# Patient Record
Sex: MALE | Race: WHITE
[De-identification: names, ages, dates, MRNs, and addresses within clinical notes are randomized per-mention and may not be internally consistent; named-entity substitution may affect disease eponyms.]

---

## 2019-04-12 NOTE — P.GSHP
History of Present Illness


H&P Date: 04/12/19


Chief Complaint: Left ureteral calculus





The patient is a 69-year-old male who developed the abrupt onset of left flank 

pain on 4/7/2019 in the evening.  The pain radiated into the left lower quadrant

and groin.  He experienced nausea and vomiting.  The pain persisted and he was 

evaluated in the [facility] emergency room on 4/8/2019.  CT scan of the 

abdomen and pelvis identified a 5.5 calculus that was located at the left 

ureterovesical junction and caused left hydro ureteronephrosis.  Multiple lower 

pole left renal calculi were also noted measuring up to 11 mm in diameter.  A 2 

x 1.4 cm high-density cortical lesion was present and was suspicious for a 

high-density cyst.  There was also an eccentric soft tissue density adjacent to 

the superior pole of dictation the measuring 3.7 x 2.7 x 3.1 cm.  Two 

nonobstructive right renal calculi measuring less than 2 or 3 mm in size were 

also noted.





The patient has no previous history of urolithiasis or gross hematuria.  He 

continues to have intermittent left flank and left groin pain.  He has been 

treated with tamsulosin, ibuprofen, hydrocodone and Zofran.  He was seen by me 

in the office on 4/12.  I reviewed options including further observation, 

ureteroscopy with lithotripsy or ESWL.  The patient is interested in the latter 

and wishes to proceed with this on 4/15 unless he passes the calculus prior to 

then.





- Constitutional


Constitutional: Denies chills, Denies fever





- Cardiovascular


Cardiovascular: Denies chest pain, Denies shortness of breath





- Respiratory


Respiratory: Denies cough, Denies wheezing





- Gastrointestinal


Gastrointestinal: Reports as per HPI, Denies change in bowel habits, Denies 

heartburn





- Genitourinary (Male)


Genitourinary: Denies dysuria, Denies hematuria





Past Medical History


Past Medical History: Cancer, Hypertension


Additional Past Medical History / Comment(s): HX MELANOMA BUTTOCK.  KIDNEY 

STONE.


History of Any Multi-Drug Resistant Organisms: None Reported


Past Surgical History: Hernia Repair


Additional Past Surgical History / Comment(s): COLONOSCOPY


Past Anesthesia/Blood Transfusion Reactions: No Reported Reaction


Smoking Status: Former smoker





- Past Family History


  ** Father


Family Medical History: Cancer


Additional Family Medical History / Comment(s): LUNG CA





  ** Mother


Family Medical History: Cancer


Additional Family Medical History / Comment(s): BREAST CA





Medications and Allergies


                                Home Medications











 Medication  Instructions  Recorded  Confirmed  Type


 


Chlorthalidone [Hygroton] 25 mg PO DAILY 04/12/19 04/12/19 History


 


HYDROcodone/APAP 5-325MG [Norco 1 tab PO Q4HR PRN 04/12/19 04/12/19 History





5-325]    


 


Ibuprofen [Motrin] 600 mg PO Q6HR PRN 04/12/19 04/12/19 History


 


Ondansetron [Zofran] 4 mg PO Q6HR PRN 04/12/19 04/12/19 History


 


Tamsulosin [Flomax] 0.4 mg PO DAILY 04/12/19 04/12/19 History


 


amLODIPine [Norvasc] 10 mg PO DAILY 04/12/19 04/12/19 History








                                    Allergies











Allergy/AdvReac Type Severity Reaction Status Date / Time


 


No Known Allergies Allergy   Verified 04/12/19 12:44














Surgical - Exam





- General


well developed, well nourished, no distress





- ENT


no hearing loss





- Neck


no masses, no lymphadectomy





- Respiratory


normal respiratory effort, clear to auscultation





- Cardiovascular


Rhythm: regular


Abnormal Heart Sounds: no systolic murmur, no diastolic murmur





- Abdomen


Abdomen: soft, no organomegaly


Hernia: none





Assessment and Plan


(1) Calculus of distal left ureter


Narrative/Plan: 


The patient will undergo ESWL treatment of his distal left ureteral calculus 

performed by Dr. Goodson.  He is aware of the operative risks which include anes

thesia, bleeding, inability to completely fragment the calculus and the possible

 need for an additional procedure such as ureteroscopy.


Status: Acute   Code(s): N20.1 - CALCULUS OF URETER   SNOMED Code(s): 536662787

## 2019-04-15 NOTE — XR
EXAMINATION TYPE: XR KUB

 

DATE OF EXAM: 4/15/2019

 

CLINICAL DATA:  69-year-old male preoperative evaluation left ureteral calculus, [facility]

 

COMPARISON:  None

 

FINDINGS:

 

Lung bases are clear. 

 

No evidence for free intraperitoneal air. 

 

Mild to moderate stool burden. Nonobstructive bowel gas pattern. No definite suspicious calcification
 appreciated symmetrically.

 

Stellate density projecting at the right iliac bone, probable bone island.

 

 

IMPRESSION: 

No definite suspicious calcification is radiographically apparent on the present exam.

## 2019-04-15 NOTE — P.OP
Date of Procedure: 04/15/19


Preoperative Diagnosis: 


Left ureteral stone


Postoperative Diagnosis: 


Same, passed


Anesthesia: none


Pathology: none sent


Disposition: same day


Indications for Procedure: 


The patient is 69.  He has a 6 mm distal ureteral stone at the ureterovesical 

junction.  It is not well seen on KUB today.  He comes for shockwave lithotripsy

he may have passed a stone is not certain as he has not been straining.


Description of Procedure: 


The patient is brought to the operating suite and placed on the lithotripsy 

table before any sedation.  I fluoroscoped the patient and do not see any 

evidence of stone therefore the procedure was not performed.





Impression: Stone, passed 





Plan patient be discharged home and follow-up in the office.

## 2022-01-25 ENCOUNTER — HOSPITAL ENCOUNTER (OUTPATIENT)
Dept: HOSPITAL 47 - RADMRIMAIN | Age: 72
Discharge: HOME | End: 2022-01-25
Attending: UROLOGY
Payer: COMMERCIAL

## 2022-01-25 DIAGNOSIS — N40.0: Primary | ICD-10-CM

## 2022-01-25 PROCEDURE — 72197 MRI PELVIS W/O & W/DYE: CPT

## 2022-01-26 NOTE — MR
EXAMINATION TYPE: MR Prostate wo/w con

 

DATE OF EXAM: 1/25/2022

 

COMPARISON: None.

 

IMAGE QUALITY: Good.

 

INDICATION: Elevated PSA 

PSA:  10.30 ng/ml on December 9, 2021

Recent Biopsy and Date: None.

Pathology Report (If Applicable): n/a

 

TECHNIQUE:  

Examination was performed using a 3T MRI without an endorectal coil. Multiparametric imaging was perf
ormed with T2 mutliplanar sequences, axial diffusion weighted imaging and dynamic contrast enhanced i
maging,  utilizing 9 mL intravenous Gadavist gadolinium contrast.  

 

FINDINGS:

 

There is no clinically significant cancer identified.

 

PROSTATE VOLUME: 5.6  cm SI x 5.1  cm AP x 6.8  cm LR  Vol= 101.7 cc

Predicted PSA equals 12.20

PSA DENSITY: 0.10  ng/ml/cc

 

Peripheral zone shows areas of indistinct slight diminished signal on ADC mapping particularly upper 
to mid segments just left of midline. No suspicious areas of more moderate hypointensity on ADC mappi
ng with increased signal on diffusion-weighted imaging.

 

Central transitional zone is hypertrophied with overall heterogeneity. No definitive suspicious areas
 of low T2 signal.

 

Prostate capsule is maintained. Seminal vesicles appear within normal limits.

 

Urinary bladder shows adequate distention without suspicious wall thickening or trabeculation. No pel
kellie adenopathy is seen. No suspicious small or large bowel dilatation. Visualized osseous structures 
are intact.

 

IMPRESSION: Mildly enlarged prostate consistent with BPH.

A focus of clinically significant cancer is not identified.

Highest Assessment Category: 2

MRI Stage: T0  N0  M0 based on review of pelvic images.

False negative rates for MRI range from 5-20% depending on risk profile.

 

 

Assessment Categories:

1 ? Very low (clinically significant cancer is highly unlikely to be present)

2 ? Low (clinically significant cancer is unlikely to be present)

3 ? Intermediate (the presence of clinically significant cancer is equivocal)

4 ? High (clinically significant cancer is likely to be present)

5 ? Very high (clinically significant cancer is highly likely to be present)

## 2024-08-30 ENCOUNTER — HOSPITAL ENCOUNTER (OUTPATIENT)
Dept: HOSPITAL 47 - RADCTMAIN | Age: 74
Discharge: HOME | End: 2024-08-30
Attending: UROLOGY
Payer: COMMERCIAL

## 2024-08-30 DIAGNOSIS — N28.89: Primary | ICD-10-CM

## 2024-08-30 DIAGNOSIS — N40.0: ICD-10-CM

## 2024-08-30 LAB — BUN SERPL-SCNC: 21 MG/DL (ref 9–20)

## 2024-08-30 PROCEDURE — 36415 COLL VENOUS BLD VENIPUNCTURE: CPT

## 2024-08-30 PROCEDURE — 74400 UROGRAPHY IV +-KUB TOMOG: CPT

## 2024-08-30 PROCEDURE — 84520 ASSAY OF UREA NITROGEN: CPT

## 2024-08-30 PROCEDURE — 82565 ASSAY OF CREATININE: CPT

## 2024-08-30 PROCEDURE — 74178 CT ABD&PLV WO CNTR FLWD CNTR: CPT

## 2024-08-30 NOTE — CT
CT urogram

 

HISTORY: Gross hematuria

 

COMPARISON: None.

 

TECHNIQUE: Multiple axial images are obtained through the abdomen and pelvis before and after the une
ventful administration nonionic IV contrast. Delayed postcontrast images were obtained.

 

FINDINGS:

 

Lung bases are clear.

 

On the pre-IV contrast images, there are no gallstones.

 

There 3 small 2 to 3 mm nonobstructing right renal calcifications. There is a 12 mm nonobstructing le
ft renal calcification and a cluster of 2 smaller nonobstructing left renal calcifications.

 

There are no focal masses within the liver, pancreas, spleen or adrenal glands and there is no organo
megaly.

 

Kidneys excrete contrast promptly and symmetrically and there is no solid renal mass, hydronephrosis 
or filling defect within the renal collecting systems, ureters or urinary bladder. There are 2 exophy
tic cysts of the left kidney,  the largest of which is approximately 4.3 cm.

 

The bowel loops are normal in caliber and there is no dilatation or obstruction. No inflammatory horton
ges are identified in the bowel wall or mesentery. There is no free intraperitoneal air or fluid.

 

There is marked prostatic hypertrophy causing mass effect on the bladder base

 

The osseous structures are intact.

 

IMPRESSION:

 

1. Bilateral nonobstructing renal calcifications as described above.

2. No solid renal mass or hydronephrosis.

3. No filling defects within the renal collecting systems.

4. Marked prostatic hypertrophy.